# Patient Record
Sex: MALE | Race: WHITE | NOT HISPANIC OR LATINO | URBAN - METROPOLITAN AREA
[De-identification: names, ages, dates, MRNs, and addresses within clinical notes are randomized per-mention and may not be internally consistent; named-entity substitution may affect disease eponyms.]

---

## 2017-05-08 ENCOUNTER — INPATIENT (INPATIENT)
Facility: HOSPITAL | Age: 52
LOS: 0 days | Discharge: HOME | DRG: 72 | End: 2017-05-09
Attending: INTERNAL MEDICINE | Admitting: INTERNAL MEDICINE
Payer: COMMERCIAL

## 2017-05-08 VITALS
WEIGHT: 229.94 LBS | TEMPERATURE: 98 F | DIASTOLIC BLOOD PRESSURE: 66 MMHG | OXYGEN SATURATION: 99 % | SYSTOLIC BLOOD PRESSURE: 111 MMHG | RESPIRATION RATE: 14 BRPM | HEIGHT: 71 IN | HEART RATE: 68 BPM

## 2017-05-08 DIAGNOSIS — G45.4 TRANSIENT GLOBAL AMNESIA: ICD-10-CM

## 2017-05-08 DIAGNOSIS — Z98.890 OTHER SPECIFIED POSTPROCEDURAL STATES: Chronic | ICD-10-CM

## 2017-05-08 DIAGNOSIS — Z29.9 ENCOUNTER FOR PROPHYLACTIC MEASURES, UNSPECIFIED: ICD-10-CM

## 2017-05-08 LAB
ANION GAP SERPL CALC-SCNC: 6 MMOL/L — SIGNIFICANT CHANGE UP (ref 5–17)
APTT BLD: 30.9 SEC — SIGNIFICANT CHANGE UP (ref 27.5–37.4)
BUN SERPL-MCNC: 17 MG/DL — SIGNIFICANT CHANGE UP (ref 7–23)
CALCIUM SERPL-MCNC: 9.3 MG/DL — SIGNIFICANT CHANGE UP (ref 8.5–10.1)
CHLORIDE SERPL-SCNC: 107 MMOL/L — SIGNIFICANT CHANGE UP (ref 96–108)
CO2 SERPL-SCNC: 28 MMOL/L — SIGNIFICANT CHANGE UP (ref 22–31)
CREAT SERPL-MCNC: 1 MG/DL — SIGNIFICANT CHANGE UP (ref 0.5–1.3)
GLUCOSE SERPL-MCNC: 101 MG/DL — HIGH (ref 70–99)
HCT VFR BLD CALC: 44.7 % — SIGNIFICANT CHANGE UP (ref 39–50)
HGB BLD-MCNC: 14.7 G/DL — SIGNIFICANT CHANGE UP (ref 13–17)
INR BLD: 1.02 RATIO — SIGNIFICANT CHANGE UP (ref 0.88–1.16)
MCHC RBC-ENTMCNC: 30.9 PG — SIGNIFICANT CHANGE UP (ref 27–34)
MCHC RBC-ENTMCNC: 32.9 GM/DL — SIGNIFICANT CHANGE UP (ref 32–36)
MCV RBC AUTO: 93.9 FL — SIGNIFICANT CHANGE UP (ref 80–100)
PCP SPEC-MCNC: SIGNIFICANT CHANGE UP
PLATELET # BLD AUTO: 211 K/UL — SIGNIFICANT CHANGE UP (ref 150–400)
POTASSIUM SERPL-MCNC: 4 MMOL/L — SIGNIFICANT CHANGE UP (ref 3.5–5.3)
POTASSIUM SERPL-SCNC: 4 MMOL/L — SIGNIFICANT CHANGE UP (ref 3.5–5.3)
PROTHROM AB SERPL-ACNC: 11.1 SEC — SIGNIFICANT CHANGE UP (ref 9.8–12.7)
RBC # BLD: 4.76 M/UL — SIGNIFICANT CHANGE UP (ref 4.2–5.8)
RBC # FLD: 13.7 % — SIGNIFICANT CHANGE UP (ref 10.3–14.5)
SODIUM SERPL-SCNC: 141 MMOL/L — SIGNIFICANT CHANGE UP (ref 135–145)
WBC # BLD: 7 K/UL — SIGNIFICANT CHANGE UP (ref 3.8–10.5)
WBC # FLD AUTO: 7 K/UL — SIGNIFICANT CHANGE UP (ref 3.8–10.5)

## 2017-05-08 PROCEDURE — 70450 CT HEAD/BRAIN W/O DYE: CPT | Mod: 26

## 2017-05-08 PROCEDURE — 93010 ELECTROCARDIOGRAM REPORT: CPT

## 2017-05-08 PROCEDURE — 99285 EMERGENCY DEPT VISIT HI MDM: CPT

## 2017-05-08 PROCEDURE — 93880 EXTRACRANIAL BILAT STUDY: CPT | Mod: 26

## 2017-05-08 RX ORDER — SIMVASTATIN 20 MG/1
10 TABLET, FILM COATED ORAL AT BEDTIME
Qty: 0 | Refills: 0 | Status: DISCONTINUED | OUTPATIENT
Start: 2017-05-08 | End: 2017-05-09

## 2017-05-08 RX ORDER — ASPIRIN/CALCIUM CARB/MAGNESIUM 324 MG
325 TABLET ORAL DAILY
Qty: 0 | Refills: 0 | Status: DISCONTINUED | OUTPATIENT
Start: 2017-05-08 | End: 2017-05-09

## 2017-05-08 RX ADMIN — Medication 325 MILLIGRAM(S): at 16:30

## 2017-05-08 RX ADMIN — SIMVASTATIN 10 MILLIGRAM(S): 20 TABLET, FILM COATED ORAL at 16:30

## 2017-05-08 NOTE — H&P ADULT - PROBLEM SELECTOR PLAN 1
-Admit to Telemetry, evaluated by Neurology (Bret) in the ED.  -Continue with tele monitoring to rule out underlying arrhythmia.  -Follow up MRI/MRA Head w/o Contrast to rule out CVA.  -Follow up US Carotid Arteries B/L to rule out Carotid Stenosis.  -Follow up TTE to further evaluate ejection fraction and rule out any structural or valvular cardiac etiology.  -Follow up EEG to rule out seizure activity.  -Follow up TSH, Lipid panel, AM labs.  -Started on ASA and Statin.  -Appreciate Neuro recommendations. -Admit to Telemetry, evaluated by Neurology (Bret) in the ED.  -Continue with tele monitoring to rule out underlying arrhythmia.  -Continue with Neuro checks.  -Follow up MRI/MRA Head w/o Contrast to rule out CVA.  -Follow up US Carotid Arteries B/L to rule out Carotid Stenosis.  -Follow up TTE to further evaluate ejection fraction and rule out any structural or valvular cardiac etiology.  -Follow up EEG to rule out seizure activity.  -Follow up TSH, Lipid panel, AM labs.  -Started on ASA and Statin.  -Appreciate Neuro recommendations.

## 2017-05-08 NOTE — H&P ADULT - NSHPPHYSICALEXAM_GEN_ALL_CORE
Physical Exam:  General: Appears forgetful, well developed, well nourished, NAD  HEENT: NCAT, PERRLA, EOMI bl, moist mucous membranes   Neck: Supple, nontender, no mass  Neurology: Failed three word recall, A&Ox3, BECKFORD x4, nonfocal, CN II-XII grossly intact, sensation intact, no gait abnormalities  Respiratory: CTA B/L, No W/R/R  CV: RRR, +S1/S2, no murmurs, rubs or gallops  Abdominal: Soft, NT, ND +BSx4  Extremities: No C/C/E, + peripheral pulses  MSK: Normal ROM, no joint erythema or warmth, no joint swelling   Skin: warm, dry, normal color, no rash or abnormal lesions

## 2017-05-08 NOTE — H&P ADULT - FAMILY HISTORY
Grandparent  Still living? Unknown  Family history of prostate cancer, Age at diagnosis: Age Unknown  Family history of breast cancer, Age at diagnosis: Age Unknown

## 2017-05-08 NOTE — DISCHARGE NOTE ADULT - HOSPITAL COURSE
50 yo M with PMH of HLD (PMD DC'd statin a few weeks ago), recent Left varicose vein stripping (4/17/2017) admitted with Transient global amnesia, r/o CVA. U tox was negative.  CT head was negative for stroke.  was consulted. Patient given aspirin 325 and started on statin.  MRI/MRA ordered which were normal. Ultrasound carotids showed no significant stenosis. Echo was ordered. EEG was done to r/o seizure activity. Patient had no further episodes of amnesia. Patient is stable and medically optimized for discharge. Patient to follow up with PMD for LFT's in 3-4 weeks after discharge. 52 yo M with PMH of HLD (PMD DC'd statin a few weeks ago), recent Left varicose vein stripping (4/17/2017) admitted with Transient global amnesia, r/o CVA. U tox was negative.  CT head was negative for stroke.  was consulted. Patient given aspirin 325 and started on statin.  MRI/MRA ordered which were normal. Ultrasound carotids showed no significant stenosis. Echo was ordered. EEG was done to r/o seizure activity. (mpr,a;)Patient had no further episodes of amnesia. Patient is stable and medically optimized for discharge. Patient to follow up with PMD for LFT's in 3-4 weeks after discharge.

## 2017-05-08 NOTE — ED PROVIDER NOTE - CONSTITUTIONAL, MLM
normal... Well appearing, well nourished, awake, alert, oriented to person, place,  in no apparent distress.

## 2017-05-08 NOTE — ED PROVIDER NOTE - OBJECTIVE STATEMENT
50 yo white male here for evaluation of being amnestic to all events of today. No complaints other than above. No fever/chills/headache/nausea/vomiting or weakness

## 2017-05-08 NOTE — PATIENT PROFILE ADULT. - PSH
H/O varicose vein stripping  4/17/2017 H/O shoulder surgery  left  H/O varicose vein stripping  4/17/2017

## 2017-05-08 NOTE — H&P ADULT - NSHPSOCIALHISTORY_GEN_ALL_CORE
Social History:    Marital Status:   Occupation: Retired, former   Lives with: Wife  Ambulates at home: with no assistive devices    Substance Use :  Tobacco Usage: never smoked  Alcohol Usage: occasional  Illicit Drug Usage: marijuana when in high school    Health Management    For male:  Last prostate exam: Does not recall.    Immunization Hx:   ( X ) flu shot                               (   Fall 2016  )   (  ) pneumonia shot               (  ) tetanus

## 2017-05-08 NOTE — DISCHARGE NOTE ADULT - MEDICATION SUMMARY - MEDICATIONS TO TAKE
I will START or STAY ON the medications listed below when I get home from the hospital:    aspirin 81 mg oral tablet  -- 1 tab(s) by mouth once a day  -- Indication: For TGA (transient global amnesia)    Zocor 10 mg oral tablet  -- 1 tab(s) by mouth once a day (at bedtime)  -- Indication: For TGA (transient global amnesia)

## 2017-05-08 NOTE — DISCHARGE NOTE ADULT - INSTRUCTIONS
Low cholesterol diet Low cholesterol diet low salt 2000 miligrams of sodium a day (general health recommendations)

## 2017-05-08 NOTE — H&P ADULT - ASSESSMENT
50 yo M with PMH of HLD (PMD DC'd statin a few weeks ago), recent Left varicose vein stripping (4/17/2017) admitted with Amnesia likely 2/2 TGA, r/o CVA. Followed by Neurology (Bret). 52 yo M with PMH of HLD (PMD DC'd statin a few weeks ago), recent Left varicose vein stripping (4/17/2017) admitted with TGA, r/o CVA. Followed by Neurology (Bret).

## 2017-05-08 NOTE — DISCHARGE NOTE ADULT - NS AS DC STROKE ED MATERIALS
High Cholesterol is a risk factor for strokes/Stroke Education Booklet/Stroke Warning Signs and Symptoms/Prescribed Medications/Need for Followup After Discharge/Call 911 for Stroke/Risk Factors for Stroke High Cholesterol is a risk factor for strokes

## 2017-05-08 NOTE — DISCHARGE NOTE ADULT - CARE PROVIDER_API CALL
Artem Mcwilliams (DO), Family Medicine  [address] [place] [id number]  Phone: [phone]  Fax: [phone] Artem Mcwilliams (), Family Medicine  [address] [place] [address]  Phone: [phone]  Fax: [phone]    Dolly Hernandez), Neurology  [address] [facility] [address]  [place] [address]  Phone: [phone]  Fax: [phone]

## 2017-05-08 NOTE — ED ADULT TRIAGE NOTE - CHIEF COMPLAINT QUOTE
"He keeps forgetting things."  pt was at gym from 0845 to 0945, had been lightheaded, then forgot locker combination, since home has kept asking repetitive questions

## 2017-05-08 NOTE — DISCHARGE NOTE ADULT - ADDITIONAL INSTRUCTIONS
Follow up with PMD after discharge  Check liver function enzymes in 3-4 weeks   Prescriptions for aspirin and statin sent to pharmacy. Follow up with dr barreto 3-5 d after discharge  see dr salcedo neurologist in 2-3 weeks show her results of all tests and lab work review with her need to continue zocar  Check liver function enzymes in 3-4 weeks as now on zocar  Prescriptions zocar sent to pharmacy.

## 2017-05-08 NOTE — H&P ADULT - NSHPREVIEWOFSYSTEMS_GEN_ALL_CORE
Constitutional: denies fever, chills, general malaise, weight loss, weight gain, diaphoresis   HEENT: denies dry mouth, sore throat, runny nose, photophobia, blurry vision, double vision, discharge, eye pain, difficulty hearing, vertigo, dysphagia, epistaxis, recent dental work    Respiratory: denies SOB, BADILLO, cough, sputum production, wheezing, hemoptysis  Cardiovascular: denies CP, palpitations, edema  Gastrointestinal: denies nausea, vomiting, diarrhea, constipation, abdominal pain, melena, hematochezia   Genitourinary: denies dysuria, frequency, urgency, incontinence, hematuria   Skin/Breast: denies rash, hives, itching, masses, hair loss   Musculoskeletal: denies myalgias, arthritis, joint swelling, muscle weakness  Neurologic: reports amnesia, lightheadedness, denies syncope, LOC, headache, weakness, dizziness, paresthesias, numbness, seizures, confusion, dementia   Psychiatric: denies feeling anxious, depressed, suicidal, homicidal thoughts  Endocrine: denies increased fingerstick glucoses, cold or heat intolerance, polydipsia, polyuria, polyphagia   Hematology/Oncology: denies bruising, tender or enlarged lymph nodes   ROS negative except as noted above

## 2017-05-08 NOTE — DISCHARGE NOTE ADULT - CARE PLAN
Principal Discharge DX:	TGA (transient global amnesia)  Goal:	Prevent recurrence  Instructions for follow-up, activity and diet:	Take aspirin 81 mg and zocor 10mg everyday.  Follow up with PMD after discharge  Check Liver function enzymes in 3-4 weeks after discharge with your PMD.  Secondary Diagnosis:	Hyperlipidemia, unspecified hyperlipidemia type  Instructions for follow-up, activity and diet:	Take zocor.  Follow up with PMD in 1- 2 days  after discharge

## 2017-05-08 NOTE — H&P ADULT - HISTORY OF PRESENT ILLNESS
50 yo M with PMH of HLD (PMD DC'd statin a few weeks ago), recent Left varicose vein stripping (4/17/2017), presented to the ED reporting that he has been forgetting the events of the past few hours. Patient accompanied by his wife Gloria at bedside. As per patient, after dropping off his wife at work this morning, he does not remember whether or not he went to the gym, and then upon returning home, he called his wife and stated that he did not remember the events since dropping her off. As per patient's wife, after dropping her off at work, patient had gone to the gym. His workout started with lifting weights, followed by a 1 mile run; during run, patient started feeling very lightheaded. He then went to the locker room to get his things and leave, but could not recall his combination. After having his lock clipped, he went into the parking lot, and did not remember which was his car. He then drove home and called his wife, stating that he felt much better. He then called his PMD, who directed him to the ED. This episode is new onset, and patient's wife stated that he remains forgetful. Reported lightheadedness which has since resolved in addition to the amnesia, denied fever, chills, chest pain, SOB, cough, abdominal pain, n/v/d/c, urinary symptoms, headache, changes in vision, recent travel, or sick contacts.    In the ED, patient was hemodynamically stable. EKH showed... Labs significant for no leukocytosis (WBC 7.0), electrolytes wnl. Drug screen performed, Drug screen negative. Patient was evaluated by Dr. Queen (Neuro) and started on ASA 325mg PO qd and Simvastatin 10mg PO qhs. 50 yo M with PMH of HLD (PMD DC'd statin a few weeks ago), recent Left varicose vein stripping (4/17/2017), presented to the ED reporting that he has been forgetting the events of the past few hours. Patient accompanied by his wife Gloria at bedside. As per patient, after dropping off his wife at work this morning, he does not remember whether or not he went to the gym, and then upon returning home, he called his wife and stated that he did not remember the events since dropping her off. As per patient's wife, after dropping her off at work, patient had gone to the gym. His workout started with lifting weights, followed by a 1 mile run; during run, patient started feeling very lightheaded. He then went to the locker room to get his things and leave, but could not recall his combination. After having his lock clipped, he went into the parking lot, and did not remember which was his car. He then drove home and called his wife, stating that he felt much better. He then called his PMD, who directed him to the ED. This episode is new onset, and patient's wife stated that he remains forgetful. Reported lightheadedness which has since resolved in addition to the amnesia, denied fever, chills, chest pain, SOB, cough, abdominal pain, n/v/d/c, urinary symptoms, headache, changes in vision, recent travel, or sick contacts.    In the ED, patient was hemodynamically stable. EKG showed... Labs significant for no leukocytosis (WBC 7.0), electrolytes wnl. Drug screen performed, Drug screen negative. Patient was evaluated by Dr. Queen (Neuro) and started on ASA 325mg PO qd and Simvastatin 10mg PO qhs. 50 yo M with PMH of HLD (PMD DC'd statin a few weeks ago), recent Left varicose vein stripping (4/17/2017), presented to the ED reporting that he has been forgetting the events of the past few hours. Patient accompanied by his wife Gloria at bedside. As per patient, after dropping off his wife at work this morning, he does not remember whether or not he went to the gym, and then upon returning home, he called his wife and stated that he did not remember the events since dropping her off. As per patient's wife, after dropping her off at work, patient had gone to the gym. His workout started with lifting weights, followed by a 1 mile run; during run, patient started feeling very lightheaded. He then went to the locker room to get his things and leave, but could not recall his combination. After having his lock clipped, he went into the parking lot, and did not remember which was his car. He then drove home and called his wife, stating that he felt much better. He then called his PMD, who directed him to the ED. This episode is new onset, and patient's wife stated that he remains forgetful. Reported lightheadedness which has since resolved in addition to the amnesia, denied fever, chills, chest pain, SOB, cough, abdominal pain, n/v/d/c, urinary symptoms, headache, changes in vision, recent travel, or sick contacts.    Patient also stated that he has been worked up for suspected Graves Disease and had previously been taking Synthroid, which has since been discontinued due to negative workup. Follows up with Endocrinology outpatient.    In the ED, patient was hemodynamically stable. EKG showed... Labs significant for no leukocytosis (WBC 7.0), electrolytes wnl. Drug screen performed, Drug screen negative. Patient was evaluated by Dr. Queen (Neuro) and started on ASA 325mg PO qd and Simvastatin 10mg PO qhs. 50 yo M with PMH of HLD (PMD DC'd statin a few weeks ago), recent Left varicose vein stripping (4/17/2017), presented to the ED reporting that he has been forgetting the events of the past few hours. Patient accompanied by his wife Gloria at bedside. As per patient, after dropping off his wife at work this morning, he does not remember whether or not he went to the gym, and then upon returning home, he called his wife and stated that he did not remember the events since dropping her off. As per patient's wife, after dropping her off at work, patient had gone to the gym. His workout started with lifting weights, followed by a 1 mile run; during run, patient started feeling very lightheaded. He then went to the locker room to get his things and leave, but could not recall his combination. After having his lock clipped, he went into the parking lot, and did not remember which was his car. He then drove home and called his wife, stating that he felt much better. He then called his PMD, who directed him to the ED. This episode is new onset, and patient's wife stated that he remains forgetful. Reported lightheadedness which has since resolved in addition to the amnesia, denied fever, chills, chest pain, SOB, cough, abdominal pain, n/v/d/c, urinary symptoms, headache, changes in vision, recent travel, or sick contacts.    Patient also stated that he has been worked up for suspected Graves Disease and had previously been taking Synthroid, which has since been discontinued due to negative workup. Follows up with Endocrinology outpatient.    In the ED, patient was hemodynamically stable. EKG showed NSR @ 71bpm. Labs significant for no leukocytosis (WBC 7.0), electrolytes wnl. Drug screen performed, Drug screen negative. CT Head was negative. Patient was evaluated by Dr. Queen (Neuro) and started on ASA 325mg PO qd and Simvastatin 10mg PO qhs.

## 2017-05-08 NOTE — DISCHARGE NOTE ADULT - NS AS ACTIVITY OBS
Walking-Indoors allowed/Return to Work/School allowed/Walking-Outdoors allowed/Stairs allowed/Showering allowed/Bathing allowed Showering allowed/Return to Work/School allowed/Do not make important decisions/Walking-Outdoors allowed/Walking-Indoors allowed/Bathing allowed/Stairs allowed

## 2017-05-08 NOTE — DISCHARGE NOTE ADULT - PLAN OF CARE
Prevent recurrence Take aspirin 81 mg and zocor 10mg everyday.  Follow up with PMD after discharge  Check Liver function enzymes in 3-4 weeks after discharge with your PMD. Take zocor.  Follow up with PMD in 1- 2 days  after discharge

## 2017-05-08 NOTE — CONSULT NOTE ADULT - SUBJECTIVE AND OBJECTIVE BOX
Clinical impression is most likely TGA.    I would recommend telemetry evaluation to rule out underlying arrhythmia.  I would recommend echocardiogram to evaluate ejection fraction.  I would recommend carotid Doppler's to evaluate for carotid stenosis.  I would recommend MRI/MRA of the brain.  I would recommend to check TSH and lipid panel.  plan for EEG   I will continue to followup.

## 2017-05-09 VITALS
OXYGEN SATURATION: 98 % | SYSTOLIC BLOOD PRESSURE: 122 MMHG | RESPIRATION RATE: 18 BRPM | HEART RATE: 63 BPM | TEMPERATURE: 98 F | DIASTOLIC BLOOD PRESSURE: 75 MMHG

## 2017-05-09 DIAGNOSIS — Z98.890 OTHER SPECIFIED POSTPROCEDURAL STATES: Chronic | ICD-10-CM

## 2017-05-09 LAB
ANION GAP SERPL CALC-SCNC: 8 MMOL/L — SIGNIFICANT CHANGE UP (ref 5–17)
BUN SERPL-MCNC: 17 MG/DL — SIGNIFICANT CHANGE UP (ref 7–23)
CALCIUM SERPL-MCNC: 8.9 MG/DL — SIGNIFICANT CHANGE UP (ref 8.5–10.1)
CHLORIDE SERPL-SCNC: 106 MMOL/L — SIGNIFICANT CHANGE UP (ref 96–108)
CHOLEST SERPL-MCNC: 169 MG/DL — SIGNIFICANT CHANGE UP (ref 10–199)
CO2 SERPL-SCNC: 29 MMOL/L — SIGNIFICANT CHANGE UP (ref 22–31)
CREAT SERPL-MCNC: 1 MG/DL — SIGNIFICANT CHANGE UP (ref 0.5–1.3)
GLUCOSE SERPL-MCNC: 94 MG/DL — SIGNIFICANT CHANGE UP (ref 70–99)
HCT VFR BLD CALC: 47 % — SIGNIFICANT CHANGE UP (ref 39–50)
HDLC SERPL-MCNC: 56 MG/DL — SIGNIFICANT CHANGE UP (ref 40–125)
HGB BLD-MCNC: 15.2 G/DL — SIGNIFICANT CHANGE UP (ref 13–17)
LIPID PNL WITH DIRECT LDL SERPL: 100 MG/DL — SIGNIFICANT CHANGE UP
MCHC RBC-ENTMCNC: 30.8 PG — SIGNIFICANT CHANGE UP (ref 27–34)
MCHC RBC-ENTMCNC: 32.4 GM/DL — SIGNIFICANT CHANGE UP (ref 32–36)
MCV RBC AUTO: 95.1 FL — SIGNIFICANT CHANGE UP (ref 80–100)
PLATELET # BLD AUTO: 206 K/UL — SIGNIFICANT CHANGE UP (ref 150–400)
POTASSIUM SERPL-MCNC: 4.3 MMOL/L — SIGNIFICANT CHANGE UP (ref 3.5–5.3)
POTASSIUM SERPL-SCNC: 4.3 MMOL/L — SIGNIFICANT CHANGE UP (ref 3.5–5.3)
RBC # BLD: 4.94 M/UL — SIGNIFICANT CHANGE UP (ref 4.2–5.8)
RBC # FLD: 14 % — SIGNIFICANT CHANGE UP (ref 10.3–14.5)
SODIUM SERPL-SCNC: 143 MMOL/L — SIGNIFICANT CHANGE UP (ref 135–145)
TOTAL CHOLESTEROL/HDL RATIO MEASUREMENT: 3 RATIO — LOW (ref 3.4–9.6)
TRIGL SERPL-MCNC: 64 MG/DL — SIGNIFICANT CHANGE UP (ref 10–149)
TSH SERPL-MCNC: 1.59 UIU/ML — SIGNIFICANT CHANGE UP (ref 0.36–3.74)
VIT B12 SERPL-MCNC: 610 PG/ML — SIGNIFICANT CHANGE UP (ref 243–894)
WBC # BLD: 6.9 K/UL — SIGNIFICANT CHANGE UP (ref 3.8–10.5)
WBC # FLD AUTO: 6.9 K/UL — SIGNIFICANT CHANGE UP (ref 3.8–10.5)

## 2017-05-09 PROCEDURE — 70450 CT HEAD/BRAIN W/O DYE: CPT

## 2017-05-09 PROCEDURE — 70551 MRI BRAIN STEM W/O DYE: CPT | Mod: 26

## 2017-05-09 PROCEDURE — 70544 MR ANGIOGRAPHY HEAD W/O DYE: CPT | Mod: 26

## 2017-05-09 PROCEDURE — 99285 EMERGENCY DEPT VISIT HI MDM: CPT | Mod: 25

## 2017-05-09 PROCEDURE — 82607 VITAMIN B-12: CPT

## 2017-05-09 PROCEDURE — 93306 TTE W/DOPPLER COMPLETE: CPT | Mod: 26

## 2017-05-09 PROCEDURE — 80048 BASIC METABOLIC PNL TOTAL CA: CPT

## 2017-05-09 PROCEDURE — 93306 TTE W/DOPPLER COMPLETE: CPT

## 2017-05-09 PROCEDURE — 93005 ELECTROCARDIOGRAM TRACING: CPT

## 2017-05-09 PROCEDURE — 84443 ASSAY THYROID STIM HORMONE: CPT

## 2017-05-09 PROCEDURE — 85027 COMPLETE CBC AUTOMATED: CPT

## 2017-05-09 PROCEDURE — 70551 MRI BRAIN STEM W/O DYE: CPT

## 2017-05-09 PROCEDURE — 80061 LIPID PANEL: CPT

## 2017-05-09 PROCEDURE — 85610 PROTHROMBIN TIME: CPT

## 2017-05-09 PROCEDURE — 95816 EEG AWAKE AND DROWSY: CPT

## 2017-05-09 PROCEDURE — 80307 DRUG TEST PRSMV CHEM ANLYZR: CPT

## 2017-05-09 PROCEDURE — 85730 THROMBOPLASTIN TIME PARTIAL: CPT

## 2017-05-09 PROCEDURE — 93880 EXTRACRANIAL BILAT STUDY: CPT

## 2017-05-09 PROCEDURE — 70544 MR ANGIOGRAPHY HEAD W/O DYE: CPT

## 2017-05-09 RX ORDER — ASPIRIN/CALCIUM CARB/MAGNESIUM 324 MG
1 TABLET ORAL
Qty: 30 | Refills: 0 | OUTPATIENT
Start: 2017-05-09 | End: 2017-06-08

## 2017-05-09 RX ORDER — SIMVASTATIN 20 MG/1
1 TABLET, FILM COATED ORAL
Qty: 30 | Refills: 0 | OUTPATIENT
Start: 2017-05-09 | End: 2017-06-08

## 2017-05-09 RX ADMIN — Medication 325 MILLIGRAM(S): at 13:31

## 2017-05-09 NOTE — PROGRESS NOTE ADULT - PROBLEM SELECTOR PLAN 1
-Ct head, MRI/MRA normal  -Ultrasound doppler carotids- No stenosis  - pending echo report and EEG  - Continue aspirin and zocor  - Discharge planning

## 2017-05-09 NOTE — PROGRESS NOTE ADULT - ASSESSMENT
50 yo M with PMH of HLD (PMD DC'd statin a few weeks ago), recent Left varicose vein stripping (4/17/2017) admitted with TGA, r/o CVA. Followed by Neurology (Bret).

## 2017-05-09 NOTE — PROGRESS NOTE ADULT - SUBJECTIVE AND OBJECTIVE BOX
Patient is a 51y old  Male who presents with a chief complaint of blacked out (08 May 2017 19:28)      INTERVAL HPI/OVERNIGHT EVENTS: 50 yo M with PMH of HLD (PMD DC'd statin a few weeks ago), recent Left varicose vein stripping (4/17/2017) admitted with TGA, r/o CVA. No acute events overnight. denies headaches, chest pain, lightheadedness , sob, abdominal pain.      MEDICATIONS  (STANDING):  aspirin 325milliGRAM(s) Oral daily  simvastatin 10milliGRAM(s) Oral at bedtime    MEDICATIONS  (PRN):      Allergies    No Known Allergies    Intolerances        REVIEW OF SYSTEMS:  CONSTITUTIONAL: No fever, No chills,No fatigue,No myalgia,No Body ache  EYES: No eye pain, visual disturbances, or discharge  ENMT:  No ear pain, No nose bleed, No vertigo; No sinus or throat pain  NECK: No pain, No stiffness  RESPIRATORY: No cough, wheezing, No  hemoptysis; No shortness of breath  CARDIOVASCULAR: No chest pain, palpitations, leg swelling  GASTROINTESTINAL: No abdominal or epigastric pain. No nausea, No vomiting; No diarrhea or constipation. [ ] BM  GENITOURINARY: No dysuria, No frequency, No urgency, No hematuria, or incontinence  NEUROLOGICAL: alert and oriented x 3,  No headaches, No dizziness, No numbness,  SKIN:   No itching, burning, rashes, or lesions   MUSCULOSKELETAL: No joint pain or swelling; No muscle pain, No back pain, No extremity pain  PSYCHIATRIC: No depression, anxiety, mood swings, or difficulty sleeping  ROS  [ ] Unable to obtain   REST OF REVIEW Of SYSTEM - [ x] Normal     Height (cm): 180.3 (05-08 @ 13:08)  Weight (kg): 104.3 (05-08 @ 13:08)  BMI (kg/m2): 32.1 (05-08 @ 13:08)  BSA (m2): 2.24 (05-08 @ 13:08)  Vital Signs Last 24 Hrs  T(C): 36.6, Max: 36.9 (05-08 @ 13:10)  T(F): 97.9, Max: 98.5 (05-08 @ 13:10)  HR: 57 (57 - 73)  BP: 118/70 (106/67 - 136/82)  BP(mean): --  RR: 19 (14 - 19)  SpO2: 100% (96% - 100%)  [ x] room air   [ ] 02    PHYSICAL EXAM:  GENERAL:  No acute distresss, well appearing, [ ] Agitated, [ ] Lethargy, [ ] confused   HEAD:  normal  ENMT: normal  NECK:  normal    NERVOUS SYSTEM:  Alert & Oriented X3, no focal deficits [ ]Confusion  [ ] Encephalopathic [ ] Sedated [ ] Other  CHEST/LUNG: Clear to auscultation bilaterally,  [ ] decreased breath sounds at bases  [ ] wheezing   [ ] rhonchi  [ ] crackles  HEART:  Regular rate and rhythm, No murmurs, rubs, or gallops,  [ ] irregular   ABDOMEN:  soft, nontender, nondistended, positive bowel sounds   [ ] obese  EXTREMITIES: No clubbing, cyanosis or edema  SKIN: [ ] venous stasis skin changes    LABS:                        15.2   6.9   )-----------( 206      ( 09 May 2017 06:54 )             47.0     09 May 2017 06:54    143    |  106    |  17     ----------------------------<  94     4.3     |  29     |  1.00     Ca    8.9        09 May 2017 06:54      PT/INR - ( 08 May 2017 14:15 )   PT: 11.1 sec;   INR: 1.02 ratio         PTT - ( 08 May 2017 14:15 )  PTT:30.9 sec      CAPILLARY BLOOD GLUCOSE    Cultures          RADIOLOGY & ADDITIONAL TESTS:   EXAM:  MRA HEAD W O CONTRAST                            PROCEDURE DATE:  05/09/2017        INTERPRETATION:  MRA head    History: Transient global amnesia    Technique 3-D TOF with 3D MIPs    There is no flow disturbance that would imply stenosis oraneurysm on   either side. The anterior, posterior and middle cerebral circulation   appears roughly symmetric.    Impression:  Normal study.    EXAM:  MRI BRAIN W O CONTRAST                            PROCEDURE DATE:  05/09/2017        INTERPRETATION:  CLINICAL HISTORY: Transient global amnesia, blackout   yesterday    COMPARISON: CT head dated 5/8/2017    TECHNIQUE: MRI brain: Multiplanar, multisequence MR imaging of the brain   are obtained without the administration of intravenous gadolinium.     FINDINGS:  There is no abnormal restricted diffusion to suggest acute infarction.   Normal signal is demonstrated throughout the brain parenchyma. Normal T2   flow-voids are seen within  the intracranial vasculature. The lateral   ventricles and cortical sulci are normal in size and configuration. There   is no mass, mass effect, or extra-axial fluid collection. There is no   susceptibility artifact to suggest hemorrhage. Midline structures are   normal. Trace inflammatory mucosal changes are seen throughout the   various portions of the paranasal sinuses. The orbits and mastoid air   cells are unremarkable.     IMPRESSION: Unremarkable MRI of the brain.    EXAM:  US DPLX CAROTIDS COMPL BI                            PROCEDURE DATE:  05/08/2017        INTERPRETATION:      CLINICAL INFORMATION:  TIA.    PROCEDURE:  Using real time, Doppler, and color flow Doppler ultrasound,   the cervical carotid and vertebral arteries were evaluated.    RIGHT CAROTID DOPPLER ULTRASOUND FINDINGS:      There is no significant plaque noted at the right carotid bulb.     The peak systolic common, and internal carotid artery velocities are 114   and 77 cm per second respectively.  This results in a peak systolic   internal to common carotid ratio of 0.7, which is normal.    The external carotid artery is patent and there is normal direction of   flow within the vertebral artery.    LEFT CAROTID DOPPLER ULTRASOUND FINDINGS:      There is no significant plaque noted at the left carotid bulb.      The peak systolic common, and internal carotid artery velocities are 136   and 79 cm per second respectively. This results in a peak systolic   internal to common carotid ratio of 0.6, which is normal.    The external carotid artery is patent and there is normal direction of   flow within the vertebral artery.    IMPRESSION:      No sonographic evidence for hemodynamically significant carotid stenosis.    EXAM:  CT BRAIN                            PROCEDURE DATE:  05/08/2017        INTERPRETATION:  History: Amnesia, blacked out.    Non contrast cranial CT, routine fashion, skull base to vertex.   The ventricles normal in size and configuration for age.  No attenuation   abnormalities noted. There is no bleed,  infarct, edema or pathologic   calcification.  No mass effect. The visualized portions sinuses are   clear.  Bony calvarium intact.    IMPRESSION: Normal noncontrast cranial CT.              NITO FRIAS M.D., ATTENDING RADIOLOGIST  This document has been electronically signed. May  8 2017  3:25PM            ALISSON BRAMBILA M.D., ATTENDING RADIOLOGIST  This document has been electronically signed. May  9 2017  9:42AM                                  FRED MAGUIRE M.D., ATTENDING RADIOLOGIST  This document has been electronically signed. May9 2017  9:41AM      Care Discussed with [X] Consultants  [ x] Patient  [ ] Family  [X]   /   [ ] Other; RN  DVT prophylaxis [ ] lovenox   [ ] subq heparin  [ ] coumadin  [ ] venodynes [ x] ambulating frequently at how risk for vte and no pharm         or  mechanical prophylaxis required    [ ] other   Advanced directive:    [ ]pt has hcp     [ ] pt declined to assign hcp  Discussed with pt @ bedside

## 2017-05-09 NOTE — PROGRESS NOTE ADULT - SUBJECTIVE AND OBJECTIVE BOX
Neurology follow up note    LORI [id number]      Interval History:    Patient feels ok no new complaints. Seen with spouse back to normal self     MEDICATIONS    aspirin 325milliGRAM(s) Oral daily  simvastatin 10milliGRAM(s) Oral at bedtime      Allergies    No Known Allergies    Intolerances        Height (cm): 180.3 (05-08 @ 13:08)  Weight (kg): 104.3 (05-08 @ 13:08)  BMI (kg/m2): 32.1 (05-08 @ 13:08)    Vital Signs Last 24 Hrs  T(C): 36.6, Max: 36.9 (05-08 @ 13:10)  T(F): 97.9, Max: 98.5 (05-08 @ 13:10)  HR: 57 (57 - 73)  BP: 118/70 (106/67 - 136/82)  BP(mean): --  RR: 19 (14 - 19)  SpO2: 100% (96% - 100%)      REVIEW OF SYSTEMS:     Constitutional: No fever, chills, fatigue, weakness  Eyes: no eye pain, visual disturbances, or discharge  ENT:  No difficulty hearing, tinnitus, vertigo; No sinus or throat pain  Neck: No pain or stiffness  Respiratory: No cough, dyspnea, wheezing   Cardiovascular: No chest pain, palpitations,   Gastrointestinal: No abdominal or epigastric pain. No nausea, vomiting  No diarrhea or constipation.   Genitourinary: No dysuria, frequency, hematuria or incontinence  Neurological: No headaches, lightheadedness, vertigo, numbness or tremors  Psychiatric: No depression, anxiety, mood swings or difficulty sleeping  Musculoskeletal: No joint pain or swelling; No muscle, back or extremity pain  Skin: No itching, burning, rashes or lesions   Lymph Nodes: No enlarged glands  Endocrine: No heat or cold intolerance; No hair loss   Allergy and Immunologic: No hives or eczema    On Neurological Examination:    Mental Status - Patient is alert, awake, oriented X3.       Follow simple commands  Follow complex commands      Speech -   Fluent                    Cranial Nerves - Pupils 3 mm equal and reactive to light,   extraocular eye movements intact.   smile symmetric  intact bilateral NLF    Motor Exam -   Right upper 5/5  Left upper 5/5  Right lower 5/5  Left lower  5/5    Muscle tone - is normal all over.  No asymmetry is seen.      Sensory    Bilateral intact to light touch    Gait -  normal  ataxia     GENERAL Exam: Nontoxic , No Acute Distress   	  HEENT:  normocephalic, atraumatic  		  LUNGS: Clear bilaterally    	  HEART: Normal S1S2   No murmur RRR        	  GI/ ABDOMEN:  Soft  Non tender    EXTREMITIES:   No Edema  No Clubbing  No Cyanosis No Edema    MUSCULOSKELETAL: Normal Range of Motion  	   SKIN: Normal  No Ecchymosis               LABS:  CBC Full  -  ( 09 May 2017 06:54 )  WBC Count : 6.9 K/uL  Hemoglobin : 15.2 g/dL  Hematocrit : 47.0 %  Platelet Count - Automated : 206 K/uL  Mean Cell Volume : 95.1 fl  Mean Cell Hemoglobin : 30.8 pg  Mean Cell Hemoglobin Concentration : 32.4 gm/dL  Auto Neutrophil # : x  Auto Lymphocyte # : x  Auto Monocyte # : x  Auto Eosinophil # : x  Auto Basophil # : x  Auto Neutrophil % : x  Auto Lymphocyte % : x  Auto Monocyte % : x  Auto Eosinophil % : x  Auto Basophil % : x      05-09    143  |  106  |  17  ----------------------------<  94  4.3   |  29  |  1.00    Ca    8.9      09 May 2017 06:54      Hemoglobin A1C:   Lipid Panel 05-09 @ 08:43  Total Cholesterol, Serum 169    Triglycerides 64      Vitamin B12 Vitamin B12, Serum: 610 pg/mL (05-09 @ 08:43)    PT/INR - ( 08 May 2017 14:15 )   PT: 11.1 sec;   INR: 1.02 ratio         PTT - ( 08 May 2017 14:15 )  PTT:30.9 sec      RADIOLOGY    ANALYSIS AND PLAN:  This is a 51-year-old with memory loss.  1.	Clinical impression most likely transient global amnesia.  2.	MRI and MRA of the brain was nomal   3.	 carotid Doppler was normal   4.	I would recommend echocardiogram.  5.	I will start the patient on aspirin 81 once a day.  6.	I will start the patient on cholesterol medication.  7.	Other differentials with transient global amnesia could be migraine, which the patient expressed he does not have a history of headache or subclinical seizure events.   8.	 I will plan for an EEG to rule out epileptiform discharges .  Spoke with spouse in great detail, her name is Gloria 5/9/17, her telephone number is [phone].  If EEG normal cleared by neurology

## 2017-05-11 DIAGNOSIS — E78.5 HYPERLIPIDEMIA, UNSPECIFIED: ICD-10-CM

## 2017-05-11 DIAGNOSIS — Z80.42 FAMILY HISTORY OF MALIGNANT NEOPLASM OF PROSTATE: ICD-10-CM

## 2017-05-11 DIAGNOSIS — G45.4 TRANSIENT GLOBAL AMNESIA: ICD-10-CM

## 2020-07-27 NOTE — ED ADULT NURSE NOTE - NS ED NOTE ABUSE SUSPICION NEGLECT YN
We can try Pantoprazole 40 mg/d. I sent RX to pharm for pt.  Please alert her.    Urvashi CONTRERAS   no

## 2021-04-23 NOTE — ED ADULT TRIAGE NOTE - BP NONINVASIVE DIASTOLIC (MM HG)
66 [Feeling Fatigued] : feeling fatigued [see HPI] : see HPI [Skin: A Rash] : rash: [Skin Lesions] : skin lesion(s): [Negative] : Gastrointestinal